# Patient Record
Sex: FEMALE | Race: WHITE | NOT HISPANIC OR LATINO | Employment: OTHER | URBAN - NONMETROPOLITAN AREA
[De-identification: names, ages, dates, MRNs, and addresses within clinical notes are randomized per-mention and may not be internally consistent; named-entity substitution may affect disease eponyms.]

---

## 2018-07-27 ENCOUNTER — OFFICE VISIT (OUTPATIENT)
Dept: OTOLARYNGOLOGY | Facility: CLINIC | Age: 63
End: 2018-07-27

## 2018-07-27 ENCOUNTER — PROCEDURE VISIT (OUTPATIENT)
Dept: OTOLARYNGOLOGY | Facility: CLINIC | Age: 63
End: 2018-07-27

## 2018-07-27 VITALS
WEIGHT: 168 LBS | RESPIRATION RATE: 20 BRPM | BODY MASS INDEX: 30.91 KG/M2 | HEART RATE: 65 BPM | DIASTOLIC BLOOD PRESSURE: 78 MMHG | TEMPERATURE: 98 F | SYSTOLIC BLOOD PRESSURE: 136 MMHG | HEIGHT: 62 IN

## 2018-07-27 DIAGNOSIS — H90.3 SENSORINEURAL HEARING LOSS (SNHL), BILATERAL: Primary | ICD-10-CM

## 2018-07-27 DIAGNOSIS — H69.83 DYSFUNCTION OF BOTH EUSTACHIAN TUBES: ICD-10-CM

## 2018-07-27 DIAGNOSIS — J30.9 ALLERGIC RHINITIS, UNSPECIFIED CHRONICITY, UNSPECIFIED SEASONALITY, UNSPECIFIED TRIGGER: ICD-10-CM

## 2018-07-27 DIAGNOSIS — H93.13 TINNITUS OF BOTH EARS: ICD-10-CM

## 2018-07-27 PROCEDURE — 92550 TYMPANOMETRY & REFLEX THRESH: CPT | Performed by: AUDIOLOGIST

## 2018-07-27 PROCEDURE — 99203 OFFICE O/P NEW LOW 30 MIN: CPT | Performed by: NURSE PRACTITIONER

## 2018-07-27 RX ORDER — LEVOTHYROXINE SODIUM 75 MCG
75 TABLET ORAL DAILY
Refills: 3 | COMMUNITY
Start: 2018-05-25

## 2018-07-27 RX ORDER — FLUTICASONE PROPIONATE 50 MCG
2 SPRAY, SUSPENSION (ML) NASAL DAILY
Qty: 1 BOTTLE | Refills: 6 | Status: SHIPPED | OUTPATIENT
Start: 2018-07-27 | End: 2018-08-26

## 2018-07-27 RX ORDER — FLUTICASONE PROPIONATE 50 MCG
2 SPRAY, SUSPENSION (ML) NASAL DAILY
COMMUNITY
End: 2018-07-27 | Stop reason: SDUPTHER

## 2018-07-27 RX ORDER — METOPROLOL SUCCINATE AND HYDROCHLOROTHIAZIDE 25; 12.5 MG/1; MG/1
TABLET ORAL
COMMUNITY

## 2018-07-27 RX ORDER — ERGOCALCIFEROL 1.25 MG/1
50000 CAPSULE ORAL WEEKLY
Refills: 3 | COMMUNITY
Start: 2018-07-14

## 2018-07-27 RX ORDER — LORAZEPAM 1 MG/1
TABLET ORAL
Refills: 0 | COMMUNITY
Start: 2018-05-15

## 2018-07-27 RX ORDER — AZELASTINE 1 MG/ML
2 SPRAY, METERED NASAL 2 TIMES DAILY
Qty: 30 ML | Refills: 6 | Status: SHIPPED | OUTPATIENT
Start: 2018-07-27 | End: 2018-08-26

## 2018-07-27 NOTE — PROGRESS NOTES
CASE HISTORY DETAILS   Ms. Holloway presented to the clinic this date with complaint of recurrent ear infection. She reported long standing symptoms. She also noted seasonal allergies and history of chronic sinus infection. She reported an episode of possible labyrinthitis approximately 6-7 years ago. She has had continued tinnitus since that time but denied ongoing problems with dizziness or balance.        SUMMARY   RIGHT  · Otoscopy revealed monomeric TM.  · Moderately-severe reverse cookie bite sensorineural hearing loss.  · Immitance measures consistent with reduced TM compliance and slight negative ME pressure.    LEFT  · Otoscopy revealed clear EAC/Unremarkable TM.  · Moderately-severe reverse cookie bite sensorineural hearing loss.  · Immitance measures consistent with reduced TM compliance.    RECOMMENDATIONS   Results of today's evaluation were discussed with Ms. Holloway and the following recommendations were made:  1. ENT evaluation today as scheduled.    AUDIOGRAM AND IMMITANCE       Kimym Lopez, CCC-A  Audiologist

## 2018-07-27 NOTE — PROGRESS NOTES
PRIMARY CARE PROVIDER: Herbert Cuellar MD  REFERRING PROVIDER: Herbert Cuellar MD    Chief Complaint   Patient presents with   • Ear Problem   • Sinus Problem       Subjective   History of Present Illness:  Geetha GARCIA is a  62 y.o. female who complains of ear fullness, ear pressure, popping and cracking of the ear and recurrent ear infections. The symptoms are localized to both ears. The patient has had moderate symptoms. The symptoms have been intermittant for the last several years. The symptoms are aggravated by  sinonasal complaints including  nasal drainage, nasal congestion, postnasal drip and allergy. The symptoms are improved by antibiotics. She reports she has a history of ear infections which became more frequent in her 20s when she started having sinus issues. She reports at least one ear infection per year with her most recent infection 1 month ago. She was treated with Augmentin and Cefprozil with resolution of symptoms. She has a long standing history of allergies and sinus infections. She has at least 2 sinus infections per year. She has a history of allergy shots when she was in her 20s. She is scheduled for allergy testing in [place] in a few weeks. She also has a history of labyrinthitis in 2015- she states she had hearing loss associated with this and has had bilateral tinnitus since then. She denies otalgia, otorrhea, dizziness, or vertigo.     Review of Systems:  Review of Systems   Constitutional: Negative for chills and fever.   HENT: Positive for congestion, hearing loss, postnasal drip, rhinorrhea and tinnitus. Negative for ear discharge, ear pain, sinus pain, sinus pressure, sore throat and voice change.    All other systems reviewed and are negative.      Past History:  Past Medical History:   Diagnosis Date   • BCC (basal cell carcinoma)    • High blood pressure    • Hypothyroidism    • Multiple thyroid nodules      Past Surgical History:   Procedure Laterality Date   •  SECTION      • GALLBLADDER SURGERY     • HYSTERECTOMY     • SKIN CANCER EXCISION      BCC EXC     History reviewed. No pertinent family history.  Social History   Substance Use Topics   • Smoking status: Never Smoker   • Smokeless tobacco: Never Used   • Alcohol use Yes      Comment: SOCIAL     Allergies:  Sulfa antibiotics    Current Outpatient Prescriptions:   •  fluticasone (FLONASE) 50 MCG/ACT nasal spray, 2 sprays into the nostril(s) as directed by provider Daily for 30 days., Disp: 1 bottle, Rfl: 6  •  LORazepam (ATIVAN) 1 MG tablet, 1/2-1 TABLET BY MOUTH EVERYDAY AS NEEDED, Disp: , Rfl: 0  •  Metoprolol-HCTZ ER 25-12.5 MG tablet sustained-release 24 hour, Take  by mouth., Disp: , Rfl:   •  SYNTHROID 75 MCG tablet, Take 75 mcg by mouth Daily., Disp: , Rfl: 3  •  vitamin D (ERGOCALCIFEROL) 81504 units capsule capsule, Take 50,000 Units by mouth 1 (One) Time Per Week., Disp: , Rfl: 3  •  azelastine (ASTELIN) 0.1 % nasal spray, 2 sprays into the nostril(s) as directed by provider 2 (Two) Times a Day for 30 days. Use in each nostril as directed, Disp: 30 mL, Rfl: 6      Objective     Vital Signs:  Temp:  [98 °F (36.7 °C)] 98 °F (36.7 °C)  Heart Rate:  [65] 65  Resp:  [20] 20  BP: (136)/(78) 136/78    Physical Exam:  Physical Exam  CONSTITUTIONAL: well nourished, well-developed, alert, oriented, in no acute distress   COMMUNICATION AND VOICE: able to communicate normally, normal voice quality  HEAD: normocephalic, no lesions, atraumatic, no tenderness, no masses   FACE: appearance normal, no lesions, no tenderness, no deformities, facial motion symmetric  SALIVARY GLANDS: parotid glands with no tenderness, no swelling, no masses, submandibular glands with normal size, nontender  EYES: ocular motility normal, eyelids normal, orbits normal, no proptosis, conjunctiva normal , pupils equal, round   EARS:  Hearing: response to conversational voice normal bilaterally   External Ears: auricles without lesions  Otoscopic: tympanic  membrane appearance normal, no lesions, no perforation, normal mobility, no fluid, left TM monomeric  NOSE:  External Nose: structure normal, no tenderness on palpation, no nasal discharge, no lesions, no evidence of trauma, nostrils patent   Intranasal Exam: nasal mucosa inflammation, vestibule within normal limits, inferior turbinate normal  ORAL:  Lips: upper and lower lips without lesion   Teeth: dentition within normal limits for age   Gums: gingivae healthy   Oral Mucosa: oral mucosa normal, no mucosal lesions   Floor of Mouth: Warthin’s duct patent, mucosa normal  Tongue: lingual mucosa normal without lesions, normal tongue mobility   Palate: soft and hard palates with normal mucosa and structure  Oropharynx: oropharyngeal mucosa normal  NECK: neck appearance normal, no masses or tenderness  THYROID: no overt thyromegaly, no tenderness, nodules or mass present on palpation, position midline   LYMPH NODES: no lymphadenopathy  CHEST/RESPIRATORY: respiratory effort normal, normal breath sounds   CARDIOVASCULAR: rate and rhythm normal, extremities without cyanosis or edema    NEUROLOGIC/PSYCHIATRIC: oriented to time, place and person, mood normal, affect appropriate, CN II-XII intact grossly    Results Review:           Assessment   Assessment:  1. Sensorineural hearing loss (SNHL), bilateral    2. Dysfunction of both eustachian tubes    3. Allergic rhinitis, unspecified chronicity, unspecified seasonality, unspecified trigger    4. Tinnitus of both ears    5. BMI 30.0-30.9,adult        Plan   Plan:    New Medications Ordered This Visit   Medications   • fluticasone (FLONASE) 50 MCG/ACT nasal spray     Si sprays into the nostril(s) as directed by provider Daily for 30 days.     Dispense:  1 bottle     Refill:  6   • azelastine (ASTELIN) 0.1 % nasal spray     Si sprays into the nostril(s) as directed by provider 2 (Two) Times a Day for 30 days. Use in each nostril as directed     Dispense:  30 mL      Refill:  6     Start nasal sprays. The proper use of nasal inhalers was discussed including the need for regular use and build up of drug levels before full effects.     QUALITY MEASURES    Body Mass Index Screening and Follow-Up Plan  Body mass index is 30.73 kg/m².  Patient's Body mass index is 30.73 kg/m². BMI is above normal parameters. Recommendations include: educational material.    Tobacco Use: Screening and Cessation Intervention  Smoking status: Never Smoker                                                              Smokeless tobacco: Never Used                          Return in about 3 months (around 10/27/2018), or if symptoms worsen or fail to improve, for Recheck.    My findings and recommendations were discussed and questions were answered.     Evangelina Mclaughlin, ROBYN  07/27/18  12:17 PM

## 2018-07-27 NOTE — PATIENT INSTRUCTIONS
###### BMI  #####   MyPlate from USDA  The general, healthful diet is based on the 2010 Dietary Guidelines for Americans. The amount of food you need to eat from each food group depends on your age, sex, and level of physical activity and can be individualized by a dietitian. Go to ChooseMyPlate.gov for more information.  What do I need to know about the MyPlate plan?  · Enjoy your food, but eat less.  · Avoid oversized portions.  ? ½ of your plate should include fruits and vegetables.  ? ¼ of your plate should be grains.  ? ¼ of your plate should be protein.  Grains  · Make at least half of your grains whole grains.  · For a 2,000 calorie daily food plan, eat 6 oz every day.  · 1 oz is about 1 slice bread, 1 cup cereal, or ½ cup cooked rice, cereal, or pasta.  Vegetables  · Make half your plate fruits and vegetables.  · For a 2,000 calorie daily food plan, eat 2½ cups every day.  · 1 cup is about 1 cup raw or cooked vegetables or vegetable juice or 2 cups raw leafy greens.  Fruits  · Make half your plate fruits and vegetables.  · For a 2,000 calorie daily food plan, eat 2 cups every day.  · 1 cup is about 1 cup fruit or 100% fruit juice or ½ cup dried fruit.  Protein  · For a 2,000 calorie daily food plan, eat 5½ oz every day.  · 1 oz is about 1 oz meat, poultry, or fish, ¼ cup cooked beans, 1 egg, 1 Tbsp peanut butter, or ½ oz nuts or seeds.  Dairy  · Switch to fat-free or low-fat (1%) milk.  · For a 2,000 calorie daily food plan, eat 3 cups every day.  · 1 cup is about 1 cup milk or yogurt or soy milk (soy beverage), 1½ oz natural cheese, or 2 oz processed cheese.  Fats, Oils, and Empty Calories  · Only small amounts of oils are recommended.  · Empty calories are calories from solid fats or added sugars.  · Compare sodium in foods like soup, bread, and frozen meals. Choose the foods with lower numbers.  · Drink water instead of sugary drinks.  What foods can I eat?  Grains  Whole grains such as whole wheat,  quinoa, millet, and bulgur. Bread, rolls, and pasta made from whole grains. Brown or wild rice. Hot or cold cereals made from whole grains and without added sugar.  Vegetables  All fresh vegetables, especially fresh red, dark green, or orange vegetables. Peas and beans. Low-sodium frozen or canned vegetables prepared without added salt. Low-sodium vegetable juices.  Fruits  All fresh, frozen, and dried fruits. Canned fruit packed in water or fruit juice without added sugar. Fruit juices without added sugar.  Meats and Other Protein Sources  Boiled, baked, or grilled lean meat trimmed of fat. Skinless poultry. Fresh seafood and shellfish. Canned seafood packed in water. Unsalted nuts and unsalted nut butters. Tofu. Dried beans and pea. Eggs.  Dairy  Low-fat or fat-free milk, yogurt, and cheeses.  Sweets and Desserts  Frozen desserts made from low-fat milk.  Fats and Oils  Olive, peanut, and canola oils and margarine. Salad dressing and mayonnaise made from these oils.  Other  Soups and casseroles made from allowed ingredients and without added fat or salt.  The items listed above may not be a complete list of recommended foods or beverages. Contact your dietitian for more options.  What foods are not recommended?  Grains  Sweetened, low-fiber cereals. Packaged baked goods. Snack crackers and chips. Cheese crackers, butter crackers, and biscuits. Frozen waffles, sweet breads, doughnuts, pastries, packaged baking mixes, pancakes, cakes, and cookies.  Vegetables  Regular canned or frozen vegetables or vegetables prepared with salt. Canned tomatoes. Canned tomato sauce. Fried vegetables. Vegetables in cream sauce or cheese sauce.  Fruits  Fruits packed in syrup or made with added sugar.  Meats and Other Protein Sources  Marbled or fatty meats such as ribs. Poultry with skin. Fried meats, poultry, eggs, or fish. Sausages, hot dogs, and deli meats such as pastrami, bologna, or salami.  Dairy  Whole milk, cream, cheeses  made from whole milk, sour cream. Ice cream or yogurt made from whole milk or with added sugar.  Beverages  For adults, no more than one alcoholic drink per day. Regular soft drinks or other sugary beverages. Juice drinks.  Sweets and Desserts  Sugary or fatty desserts, candy, and other sweets.  Fats and Oils  Solid shortening or partially hydrogenated oils. Solid margarine. Margarine that contains trans fats. Butter.  The items listed above may not be a complete list of foods and beverages to avoid. Contact your dietitian for more information.  This information is not intended to replace advice given to you by your health care provider. Make sure you discuss any questions you have with your health care provider.  Document Released: 01/06/2009 Document Revised: 05/25/2017 Document Reviewed: 11/26/2014  [employer] Interactive Patient Education © 2018 [employer]     Calorie Counting for Weight Loss  Calories are units of energy. Your body needs a certain amount of calories from food to keep you going throughout the day. When you eat more calories than your body needs, your body stores the extra calories as fat. When you eat fewer calories than your body needs, your body burns fat to get the energy it needs.  Calorie counting means keeping track of how many calories you eat and drink each day. Calorie counting can be helpful if you need to lose weight. If you make sure to eat fewer calories than your body needs, you should lose weight. Ask your health care provider what a healthy weight is for you.  For calorie counting to work, you will need to eat the right number of calories in a day in order to lose a healthy amount of weight per week. A dietitian can help you determine how many calories you need in a day and will give you suggestions on how to reach your calorie goal.  · A healthy amount of weight to lose per week is usually 1-2 lb (0.5-0.9 kg). This usually means that your daily calorie intake should be reduced  by 500-750 calories.  · Eating 1,200 - 1,500 calories per day can help most women lose weight.  · Eating 1,500 - 1,800 calories per day can help most men lose weight.    What do I need to know about calorie counting?  In order to meet your daily calorie goal, you will need to:  · Find out how many calories are in each food you would like to eat. Try to do this before you eat.  · Decide how much of the food you plan to eat.  · Write down what you ate and how many calories it had. Doing this is called keeping a food log.    To successfully lose weight, it is important to balance calorie counting with a healthy lifestyle that includes regular activity. Aim for 150 minutes of moderate exercise (such as walking) or 75 minutes of vigorous exercise (such as running) each week.  Where do I find calorie information?    The number of calories in a food can be found on a Nutrition Facts label. If a food does not have a Nutrition Facts label, try to look up the calories online or ask your dietitian for help.  Remember that calories are listed per serving. If you choose to have more than one serving of a food, you will have to multiply the calories per serving by the amount of servings you plan to eat. For example, the label on a package of bread might say that a serving size is 1 slice and that there are 90 calories in a serving. If you eat 1 slice, you will have eaten 90 calories. If you eat 2 slices, you will have eaten 180 calories.  How do I keep a food log?  Immediately after each meal, record the following information in your food log:  · What you ate. Don't forget to include toppings, sauces, and other extras on the food.  · How much you ate. This can be measured in cups, ounces, or number of items.  · How many calories each food and drink had.  · The total number of calories in the meal.    Keep your food log near you, such as in a small notebook in your pocket, or use a mobile raquel or website. Some programs will  "calculate calories for you and show you how many calories you have left for the day to meet your goal.  What are some calorie counting tips?  · Use your calories on foods and drinks that will fill you up and not leave you hungry:  ? Some examples of foods that fill you up are nuts and nut butters, vegetables, lean proteins, and high-fiber foods like whole grains. High-fiber foods are foods with more than 5 g fiber per serving.  ? Drinks such as sodas, specialty coffee drinks, alcohol, and juices have a lot of calories, yet do not fill you up.  · Eat nutritious foods and avoid empty calories. Empty calories are calories you get from foods or beverages that do not have many vitamins or protein, such as candy, sweets, and soda. It is better to have a nutritious high-calorie food (such as an avocado) than a food with few nutrients (such as a bag of chips).  · Know how many calories are in the foods you eat most often. This will help you calculate calorie counts faster.  · Pay attention to calories in drinks. Low-calorie drinks include water and unsweetened drinks.  · Pay attention to nutrition labels for \"low fat\" or \"fat free\" foods. These foods sometimes have the same amount of calories or more calories than the full fat versions. They also often have added sugar, starch, or salt, to make up for flavor that was removed with the fat.  · Find a way of tracking calories that works for you. Get creative. Try different apps or programs if writing down calories does not work for you.  What are some portion control tips?  · Know how many calories are in a serving. This will help you know how many servings of a certain food you can have.  · Use a measuring cup to measure serving sizes. You could also try weighing out portions on a kitchen scale. With time, you will be able to estimate serving sizes for some foods.  · Take some time to put servings of different foods on your favorite plates, bowls, and cups so you know what a " serving looks like.  · Try not to eat straight from a bag or box. Doing this can lead to overeating. Put the amount you would like to eat in a cup or on a plate to make sure you are eating the right portion.  · Use smaller plates, glasses, and bowls to prevent overeating.  · Try not to multitask (for example, watch TV or use your computer) while eating. If it is time to eat, sit down at a table and enjoy your food. This will help you to know when you are full. It will also help you to be aware of what you are eating and how much you are eating.  What are tips for following this plan?  Reading food labels  · Check the calorie count compared to the serving size. The serving size may be smaller than what you are used to eating.  · Check the source of the calories. Make sure the food you are eating is high in vitamins and protein and low in saturated and trans fats.  Shopping  · Read nutrition labels while you shop. This will help you make healthy decisions before you decide to purchase your food.  · Make a grocery list and stick to it.  Cooking  · Try to cook your favorite foods in a healthier way. For example, try baking instead of frying.  · Use low-fat dairy products.  Meal planning  · Use more fruits and vegetables. Half of your plate should be fruits and vegetables.  · Include lean proteins like poultry and fish.  How do I count calories when eating out?  · Ask for smaller portion sizes.  · Consider sharing an entree and sides instead of getting your own entree.  · If you get your own entree, eat only half. Ask for a box at the beginning of your meal and put the rest of your entree in it so you are not tempted to eat it.  · If calories are listed on the menu, choose the lower calorie options.  · Choose dishes that include vegetables, fruits, whole grains, low-fat dairy products, and lean protein.  · Choose items that are boiled, broiled, grilled, or steamed. Stay away from items that are buttered, battered, fried,  or served with cream sauce. Items labeled “crispy” are usually fried, unless stated otherwise.  · Choose water, low-fat milk, unsweetened iced tea, or other drinks without added sugar. If you want an alcoholic beverage, choose a lower calorie option such as a glass of wine or light beer.  · Ask for dressings, sauces, and syrups on the side. These are usually high in calories, so you should limit the amount you eat.  · If you want a salad, choose a garden salad and ask for grilled meats. Avoid extra toppings like stallings, cheese, or fried items. Ask for the dressing on the side, or ask for olive oil and vinegar or lemon to use as dressing.  · Estimate how many servings of a food you are given. For example, a serving of cooked rice is ½ cup or about the size of half a baseball. Knowing serving sizes will help you be aware of how much food you are eating at restaurants. The list below tells you how big or small some common portion sizes are based on everyday objects:  ? 1 oz--4 stacked dice.  ? 3 oz--1 deck of cards.  ? 1 tsp--1 die.  ? 1 Tbsp--½ a ping-pong ball.  ? 2 Tbsp--1 ping-pong ball.  ? ½ cup--½ baseball.  ? 1 cup--1 baseball.  Summary  · Calorie counting means keeping track of how many calories you eat and drink each day. If you eat fewer calories than your body needs, you should lose weight.  · A healthy amount of weight to lose per week is usually 1-2 lb (0.5-0.9 kg). This usually means reducing your daily calorie intake by 500-750 calories.  · The number of calories in a food can be found on a Nutrition Facts label. If a food does not have a Nutrition Facts label, try to look up the calories online or ask your dietitian for help.  · Use your calories on foods and drinks that will fill you up, and not on foods and drinks that will leave you hungry.  · Use smaller plates, glasses, and bowls to prevent overeating.  This information is not intended to replace advice given to you by your health care provider.  Make sure you discuss any questions you have with your health care provider.  Document Released: 12/18/2006 Document Revised: 11/17/2017 Document Reviewed: 11/17/2017  [employer] Interactive Patient Education © 2018 [employer]     Exercising to Lose Weight  Exercising can help you to lose weight. In order to lose weight through exercise, you need to do vigorous-intensity exercise. You can tell that you are exercising with vigorous intensity if you are breathing very hard and fast and cannot hold a conversation while exercising.  Moderate-intensity exercise helps to maintain your current weight. You can tell that you are exercising at a moderate level if you have a higher heart rate and faster breathing, but you are still able to hold a conversation.  How often should I exercise?  Choose an activity that you enjoy and set realistic goals. Your health care provider can help you to make an activity plan that works for you. Exercise regularly as directed by your health care provider. This may include:  · Doing resistance training twice each week, such as:  ? Push-ups.  ? Sit-ups.  ? Lifting weights.  ? Using resistance bands.  · Doing a given intensity of exercise for a given amount of time. Choose from these options:  ? 150 minutes of moderate-intensity exercise every week.  ? 75 minutes of vigorous-intensity exercise every week.  ? A mix of moderate-intensity and vigorous-intensity exercise every week.    Children, pregnant women, people who are out of shape, people who are overweight, and older adults may need to consult a health care provider for individual recommendations. If you have any sort of medical condition, be sure to consult your health care provider before starting a new exercise program.  What are some activities that can help me to lose weight?  · Walking at a rate of at least 4.5 miles an hour.  · Jogging or running at a rate of 5 miles per hour.  · Biking at a rate of at least 10 miles per  hour.  · Lap swimming.  · Roller-skating or in-line skating.  · Cross-country skiing.  · Vigorous competitive sports, such as football, basketball, and soccer.  · Jumping rope.  · Aerobic dancing.  How can I be more active in my day-to-day activities?  · Use the stairs instead of the elevator.  · Take a walk during your lunch break.  · If you drive, park your car farther away from work or school.  · If you take public transportation, get off one stop early and walk the rest of the way.  · Make all of your phone calls while standing up and walking around.  · Get up, stretch, and walk around every 30 minutes throughout the day.  What guidelines should I follow while exercising?  · Do not exercise so much that you hurt yourself, feel dizzy, or get very short of breath.  · Consult your health care provider prior to starting a new exercise program.  · Wear comfortable clothes and shoes with good support.  · Drink plenty of water while you exercise to prevent dehydration or heat stroke. Body water is lost during exercise and must be replaced.  · Work out until you breathe faster and your heart beats faster.  This information is not intended to replace advice given to you by your health care provider. Make sure you discuss any questions you have with your health care provider.  Document Released: 01/20/2012 Document Revised: 05/25/2017 Document Reviewed: 05/21/2015  [email or web address] Interactive Patient Education © 2018 [employer]

## 2018-10-12 ENCOUNTER — OFFICE VISIT (OUTPATIENT)
Dept: OTOLARYNGOLOGY | Facility: CLINIC | Age: 63
End: 2018-10-12

## 2018-10-12 VITALS
WEIGHT: 168 LBS | HEIGHT: 62 IN | BODY MASS INDEX: 30.91 KG/M2 | RESPIRATION RATE: 20 BRPM | SYSTOLIC BLOOD PRESSURE: 136 MMHG | TEMPERATURE: 98 F | DIASTOLIC BLOOD PRESSURE: 78 MMHG | HEART RATE: 64 BPM

## 2018-10-12 DIAGNOSIS — H69.83 DYSFUNCTION OF BOTH EUSTACHIAN TUBES: Primary | ICD-10-CM

## 2018-10-12 DIAGNOSIS — J30.9 ALLERGIC RHINITIS, UNSPECIFIED SEASONALITY, UNSPECIFIED TRIGGER: ICD-10-CM

## 2018-10-12 DIAGNOSIS — H93.13 TINNITUS OF BOTH EARS: ICD-10-CM

## 2018-10-12 DIAGNOSIS — M26.609 TMJ (TEMPOROMANDIBULAR JOINT DISORDER): ICD-10-CM

## 2018-10-12 DIAGNOSIS — H90.3 SENSORINEURAL HEARING LOSS (SNHL), BILATERAL: ICD-10-CM

## 2018-10-12 PROCEDURE — 99213 OFFICE O/P EST LOW 20 MIN: CPT | Performed by: NURSE PRACTITIONER

## 2018-10-12 RX ORDER — LEVOCETIRIZINE DIHYDROCHLORIDE 5 MG/1
5 TABLET, FILM COATED ORAL
COMMUNITY
Start: 2018-08-06 | End: 2019-02-03

## 2018-10-12 RX ORDER — FLUTICASONE PROPIONATE 50 MCG
2 SPRAY, SUSPENSION (ML) NASAL
COMMUNITY

## 2018-10-12 RX ORDER — ESOMEPRAZOLE MAGNESIUM 40 MG/1
40 CAPSULE, DELAYED RELEASE ORAL
COMMUNITY

## 2018-10-12 RX ORDER — MELOXICAM 15 MG/1
TABLET ORAL
COMMUNITY
Start: 2018-10-11

## 2018-10-27 PROBLEM — H93.13 TINNITUS OF BOTH EARS: Status: ACTIVE | Noted: 2018-10-27

## 2018-10-27 PROBLEM — M26.609 TMJ (TEMPOROMANDIBULAR JOINT DISORDER): Status: ACTIVE | Noted: 2018-10-27

## 2018-10-27 NOTE — PROGRESS NOTES
PRIMARY CARE PROVIDER: Herbert Cuellar MD  REFERRING PROVIDER: No ref. provider found    Chief Complaint   Patient presents with   • Follow-up       Subjective   History of Present Illness:  Geetha GARCIA is a  63 y.o. female who is here to follow-up from complaints of ear fullness, ear pressure, popping and cracking of the ear and recurrent ear infections. The symptoms are localized to both ears. The patient has had stable symptoms since her last visit. The symptoms have been intermittant for the last several years. The symptoms are aggravated by  sinonasal complaints including  nasal drainage, nasal congestion, postnasal drip and allergy. The symptoms are improved by antibiotics. She reports she has a history of ear infections which became more frequent in her 20s when she started having sinus issues. She reports at least one ear infection per year.  She has been taking Flonase and Xyzal and she feels this has stabilized her symptoms and her symptoms are currently well controlled.  She has not had any ear infections since her last visit.    She has a long standing history of allergies and sinus infections. She has at least 2 sinus infections per year. She has a history of allergy shots when she was in her 20s.  She was recently allergy tested in [place] and states she will be starting immunotherapy in April.     She also has a history of labyrinthitis in 2015- she states she had hearing loss associated with this and has had bilateral tinnitus since then. She denies otalgia, otorrhea, dizziness, or vertigo.     She reports a recent flair up of otalgia of the left ear.  This is been present for the last few months.  She does report a history of TMJ.  She has been wearing a mouth guard and was started on mobic with an improvement in symptoms.  Her dentist made her a custom mouth guard.  She does report an increase in stress over the last year.      Review of Systems:  Review of Systems   Constitutional: Negative  "for chills and fever.   HENT: Positive for congestion, ear pain, hearing loss and tinnitus. Negative for ear discharge, postnasal drip, rhinorrhea, sinus pain, sinus pressure, sore throat and voice change.    All other systems reviewed and are negative.      Past History:  Past Medical History:   Diagnosis Date   • BCC (basal cell carcinoma)    • High blood pressure    • Hypothyroidism    • Multiple thyroid nodules      Past Surgical History:   Procedure Laterality Date   •  SECTION     • GALLBLADDER SURGERY     • HYSTERECTOMY     • SKIN CANCER EXCISION      BCC EXC     History reviewed. No pertinent family history.  Social History   Substance Use Topics   • Smoking status: Never Smoker   • Smokeless tobacco: Never Used   • Alcohol use Yes      Comment: SOCIAL     Allergies:  Tramadol; Betamethasone; and Sulfa antibiotics    Current Outpatient Prescriptions:   •  esomeprazole (nexIUM) 40 MG capsule, Take 40 mg by mouth., Disp: , Rfl:   •  fluticasone (FLONASE) 50 MCG/ACT nasal spray, 2 sprays into the nostril(s) as directed by provider., Disp: , Rfl:   •  levocetirizine (XYZAL) 5 MG tablet, Take 5 mg by mouth., Disp: , Rfl:   •  LORazepam (ATIVAN) 1 MG tablet, 1/2-1 TABLET BY MOUTH EVERYDAY AS NEEDED, Disp: , Rfl: 0  •  meloxicam (MOBIC) 15 MG tablet, , Disp: , Rfl:   •  Metoprolol-HCTZ ER 25-12.5 MG tablet sustained-release 24 hour, Take  by mouth., Disp: , Rfl:   •  SYNTHROID 75 MCG tablet, Take 75 mcg by mouth Daily., Disp: , Rfl: 3  •  vitamin D (ERGOCALCIFEROL) 40612 units capsule capsule, Take 50,000 Units by mouth 1 (One) Time Per Week., Disp: , Rfl: 3      Objective     Vital Signs:    /78   Pulse 64   Temp 98 °F (36.7 °C)   Resp 20   Ht 157.5 cm (62\")   Wt 76.2 kg (168 lb)   BMI 30.73 kg/m²     Physical Exam:  Physical Exam  CONSTITUTIONAL: well nourished, well-developed, alert, oriented, in no acute distress   COMMUNICATION AND VOICE: able to communicate normally, normal voice " quality  HEAD: normocephalic, no lesions, atraumatic, no tenderness, no masses   FACE: appearance normal, no lesions, no tenderness, no deformities, facial motion symmetric, bilateral TMJ crepitus  SALIVARY GLANDS: parotid glands with no tenderness, no swelling, no masses, submandibular glands with normal size, nontender  EYES: ocular motility normal, eyelids normal, orbits normal, no proptosis, conjunctiva normal , pupils equal, round   EARS:  Hearing: response to conversational voice normal bilaterally   External Ears: auricles without lesions  Otoscopic: tympanic membrane appearance normal, no lesions, no perforation, normal mobility, no fluid, left TM monomeric  NOSE:  External Nose: structure normal, no tenderness on palpation, no nasal discharge, no lesions, no evidence of trauma, nostrils patent   Intranasal Exam: nasal mucosa inflammation, vestibule within normal limits, inferior turbinate normal  ORAL:  Lips: upper and lower lips without lesion   Teeth: dentition within normal limits for age   Gums: gingivae healthy   Oral Mucosa: oral mucosa normal, no mucosal lesions   Floor of Mouth: Warthin’s duct patent, mucosa normal  Tongue: lingual mucosa normal without lesions, normal tongue mobility   Palate: soft and hard palates with normal mucosa and structure  Oropharynx: oropharyngeal mucosa normal  NECK: neck appearance normal, no masses or tenderness  THYROID: no overt thyromegaly, no tenderness, nodules or mass present on palpation, position midline   LYMPH NODES: no lymphadenopathy  CHEST/RESPIRATORY: respiratory effort normal, normal breath sounds   CARDIOVASCULAR: rate and rhythm normal, extremities without cyanosis or edema    NEUROLOGIC/PSYCHIATRIC: oriented to time, place and person, mood normal, affect appropriate, CN II-XII intact grossly    Results Review:           Assessment   Assessment:  1. Dysfunction of both eustachian tubes    2. Allergic rhinitis, unspecified seasonality, unspecified  trigger    3. Sensorineural hearing loss (SNHL), bilateral    4. Tinnitus of both ears    5. TMJ (temporomandibular joint disorder)    6. BMI 30.0-30.9,adult        Plan   Plan:    Continue Flonase and Xyzal.     TMJ precautions discussed and handout given.    Patient inquired about obtaining MRI due to her hearing loss and history of labyrinthitis.  I do not feel this particularly necessary at this time.  She remains a little anxious about this.  We will continue to monitor her hearing loss with a repeat audiogram at follow-up.  She would like to consider an MRI if hearing worsens.  The patient is agreeable to this plan.    Call for ear drainage, ear pain, fever over 101, or hearing loss. Call for problems or worsening symptoms.       QUALITY MEASURES    Body Mass Index Screening and Follow-Up Plan  Body mass index is 30.73 kg/m².  Patient's Body mass index is 30.73 kg/m². BMI is above normal parameters. Recommendations include: educational material.    Tobacco Use: Screening and Cessation Intervention  Smoking status: Never Smoker                                                              Smokeless tobacco: Never Used                          Return in about 7 months (around 5/12/2019) for With Audio.    My findings and recommendations were discussed and questions were answered.     ROBYN Nolasco

## 2021-07-07 ENCOUNTER — OFFICE VISIT (OUTPATIENT)
Dept: OTOLARYNGOLOGY | Facility: CLINIC | Age: 66
End: 2021-07-07

## 2021-07-07 VITALS — HEIGHT: 61 IN | WEIGHT: 172.6 LBS | TEMPERATURE: 98.6 F | BODY MASS INDEX: 32.59 KG/M2

## 2021-07-07 DIAGNOSIS — D48.5 NEOPLASM OF UNCERTAIN BEHAVIOR OF SKIN: Primary | ICD-10-CM

## 2021-07-07 DIAGNOSIS — L81.4 SOLAR LENTIGO: ICD-10-CM

## 2021-07-07 PROCEDURE — 99214 OFFICE O/P EST MOD 30 MIN: CPT | Performed by: OTOLARYNGOLOGY

## 2021-07-07 RX ORDER — LEVOCETIRIZINE DIHYDROCHLORIDE 5 MG/1
5 TABLET, FILM COATED ORAL DAILY
COMMUNITY
Start: 2021-06-29

## 2021-07-07 RX ORDER — AMOXICILLIN AND CLAVULANATE POTASSIUM 875; 125 MG/1; MG/1
1 TABLET, FILM COATED ORAL 2 TIMES DAILY
COMMUNITY
Start: 2021-07-02 | End: 2022-04-13

## 2021-07-07 RX ORDER — CYCLOBENZAPRINE HCL 10 MG
10 TABLET ORAL DAILY
COMMUNITY
Start: 2021-03-02

## 2021-07-07 RX ORDER — ROSUVASTATIN CALCIUM 5 MG/1
5 TABLET, COATED ORAL
COMMUNITY
Start: 2021-04-24

## 2021-07-07 NOTE — PROGRESS NOTES
PRIMARY CARE PROVIDER: Herbert Cuellar MD  REFERRING PROVIDER: No ref. provider found    Chief Complaint   Patient presents with   • Skin Lesion       Subjective   History of Present Illness:  Geetha Holloway is a  65 y.o. female who presents for consultation regarding a skin lesion of the left cheek.  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    Review of Systems:  Review of Systems   Constitutional: Negative for chills, fatigue, fever and unexpected weight change.   HENT: Negative for facial swelling.    Respiratory: Negative for cough, chest tightness and shortness of breath.    Cardiovascular: Negative for chest pain.   Musculoskeletal: Negative for neck pain.   Skin: Positive for color change.   Neurological: Negative for facial asymmetry.   Hematological: Negative for adenopathy. Does not bruise/bleed easily.       Past History:  Past Medical History:   Diagnosis Date   • BCC (basal cell carcinoma)    • High blood pressure    • Hypothyroidism    • Multiple thyroid nodules      Past Surgical History:   Procedure Laterality Date   •  SECTION     • GALLBLADDER SURGERY     • HYSTERECTOMY     • REPLACEMENT TOTAL KNEE     • SKIN CANCER EXCISION      BCC EXC     History reviewed. No pertinent family history.  Social History     Tobacco Use   • Smoking status: Never Smoker   • Smokeless tobacco: Never Used   Substance Use Topics   • Alcohol use: Yes     Comment: SOCIAL   • Drug use: Not on file     Allergies:  Tramadol, Betamethasone, and Sulfa antibiotics    Current Outpatient Medications:   •  amoxicillin-clavulanate (AUGMENTIN) 875-125 MG per tablet, Take 1 tablet by mouth 2 (Two) Times a Day., Disp: , Rfl:   •  esomeprazole  (nexIUM) 40 MG capsule, Take 40 mg by mouth., Disp: , Rfl:   •  fluticasone (FLONASE) 50 MCG/ACT nasal spray, 2 sprays into the nostril(s) as directed by provider., Disp: , Rfl:   •  levocetirizine (XYZAL) 5 MG tablet, Take 5 mg by mouth Daily., Disp: , Rfl:   •  meloxicam (MOBIC) 15 MG tablet, , Disp: , Rfl:   •  Metoprolol-HCTZ ER 25-12.5 MG tablet sustained-release 24 hour, Take  by mouth., Disp: , Rfl:   •  rosuvastatin (CRESTOR) 5 MG tablet, Take 5 mg by mouth every night at bedtime., Disp: , Rfl:   •  SYNTHROID 75 MCG tablet, Take 75 mcg by mouth Daily., Disp: , Rfl: 3  •  vitamin D (ERGOCALCIFEROL) 43467 units capsule capsule, Take 50,000 Units by mouth 1 (One) Time Per Week., Disp: , Rfl: 3  •  cyclobenzaprine (FLEXERIL) 10 MG tablet, Take 10 mg by mouth Daily., Disp: , Rfl:   •  LORazepam (ATIVAN) 1 MG tablet, 1/2-1 TABLET BY MOUTH EVERYDAY AS NEEDED, Disp: , Rfl: 0    Objective     Vital Signs:  Temp:  [98.6 °F (37 °C)] 98.6 °F (37 °C)    Physical Exam:  Physical Exam    Operative plan:    Excision with layered closure in the office under local    Assessment   1. Neoplasm of uncertain behavior of skin    2. Solar lentigo        Plan     The risks, benefits, and alternatives of the procedure including but not limited to pain, scarring, bleeding, infection, persistent symptoms, and risks of the anesthesia were discussed full with the patient. Need for further therapy, depending on the pathologic outcome, was discussed. Questions were answered. No guarantees were made or implied.      I have offered excision of the skin lesion of the left cheek with  permanent section analysis and likely  linear closure closure in the office under local anesthesia.    Discussion of skin lesion. Discussed risks, benefits, alternatives, and possible complications of excision of the skin lesion with reconstruction utilizing local tissue rearrangement, full-thickness skin grafting, or local interpolated flaps. Risks include, but  are not limited too: bleeding, infection, hematoma, recurrence, need for additional procedures, flap failure, cosmetic deformity. Patient understands risks and would like to proceed with surgery.     She is also interested in the photo facial.  I discussed that this would be best performed in the fall or winter.  She spends the urbina at the [facility] in [place].  She may opt to have it performed then.    My findings and recommendations were discussed and questions were answered.     Mj Wetzel MD  07/07/21  13:55 CDT

## 2021-07-07 NOTE — PATIENT INSTRUCTIONS
CONTACT INFORMATION:  The main office phone number is [phone]. For emergencies after hours and on weekends, this number will convert over to our answering service and the on call provider will answer. Please try to keep non emergent phone calls/ questions to office hours 9am-5pm Monday through Friday.     SlideRocket  As an alternative, you can sign up and use the Epic MyChart system for more direct and quicker access for non emergent questions/ problems.  [facility] [employer]ideRocket allows you to send messages to your doctor, view your test results, renew your prescriptions, schedule appointments, and more. To sign up, go to [employer] and click on the Sign Up Now link in the New User? box. Enter your SlideRocket Activation Code exactly as it appears below along with the last four digits of your Social Security Number and your Date of Birth () to complete the sign-up process. If you do not sign up before the expiration date, you must request a new code.    SlideRocket Activation Code: [email or web address]  Expires: 2021  1:57 PM    If you have questions, you can email [email or web address] or call [phone] to talk to our SlideRocket staff. Remember, [employer]Rocket is NOT to be used for urgent needs. For medical emergencies, dial 911.

## 2021-08-19 ENCOUNTER — TRANSCRIBE ORDERS (OUTPATIENT)
Dept: LAB | Facility: HOSPITAL | Age: 66
End: 2021-08-19

## 2021-08-19 ENCOUNTER — LAB (OUTPATIENT)
Dept: LAB | Facility: HOSPITAL | Age: 66
End: 2021-08-19

## 2021-08-19 DIAGNOSIS — Z01.818 PREOPERATIVE TESTING: Primary | ICD-10-CM

## 2021-08-19 LAB — SARS-COV-2 ORF1AB RESP QL NAA+PROBE: NOT DETECTED

## 2021-08-19 PROCEDURE — C9803 HOPD COVID-19 SPEC COLLECT: HCPCS | Performed by: OTOLARYNGOLOGY

## 2021-08-19 PROCEDURE — U0005 INFEC AGEN DETEC AMPLI PROBE: HCPCS | Performed by: OTOLARYNGOLOGY

## 2021-08-19 PROCEDURE — U0004 COV-19 TEST NON-CDC HGH THRU: HCPCS | Performed by: OTOLARYNGOLOGY

## 2021-08-23 ENCOUNTER — PROCEDURE VISIT (OUTPATIENT)
Dept: OTOLARYNGOLOGY | Facility: CLINIC | Age: 66
End: 2021-08-23

## 2021-08-23 VITALS — HEART RATE: 73 BPM | SYSTOLIC BLOOD PRESSURE: 106 MMHG | DIASTOLIC BLOOD PRESSURE: 68 MMHG | TEMPERATURE: 97.2 F

## 2021-08-23 DIAGNOSIS — D48.5 NEOPLASM OF UNCERTAIN BEHAVIOR OF SKIN: Primary | ICD-10-CM

## 2021-08-23 PROCEDURE — 88305 TISSUE EXAM BY PATHOLOGIST: CPT | Performed by: OTOLARYNGOLOGY

## 2021-08-23 PROCEDURE — 13131 CMPLX RPR F/C/C/M/N/AX/G/H/F: CPT | Performed by: OTOLARYNGOLOGY

## 2021-08-23 PROCEDURE — 11441 EXC FACE-MM B9+MARG 0.6-1 CM: CPT | Performed by: OTOLARYNGOLOGY

## 2021-08-23 NOTE — PATIENT INSTRUCTIONS
CONTACT INFORMATION:  The main office phone number is [phone]. For emergencies after hours and on weekends, this number will convert over to our answering service and the on call provider will answer. Please try to keep non emergent phone calls/ questions to office hours 9am-5pm Monday through Friday.     [employer]  As an alternative, you can sign up and use the Epic MyChart system for more direct and quicker access for non emergent questions/ problems.  [facility] [employer]Ads allows you to send messages to your doctor, view your test results, renew your prescriptions, schedule appointments, and more. To sign up, go to [employer] and click on the Sign Up Now link in the New User? box. Enter your [employer] Activation Code exactly as it appears below along with the last four digits of your Social Security Number and your Date of Birth () to complete the sign-up process. If you do not sign up before the expiration date, you must request a new code.    [employer] Activation Code: [email or web address]  Expires: 2021 11:28 AM    If you have questions, you can email [email or web address] or call [phone] to talk to our [employer] staff. Remember, [employer] is NOT to be used for urgent needs. For medical emergencies, dial 911.

## 2021-08-23 NOTE — PROGRESS NOTES
PATIENT NAME:  Geetha Holloway    DATE:  08/23/21    PREOPERATIVE DIAGNOSIS: Changing, pigmented lesion of left jawline    POSTOPERATIVE DIAGNOSIS: Changing, pigmented lesion of left jawline    PROCEDURE:  1) excision of neoplasm of uncertain behavior skin of left jawline, 8 mm x 20 mm   2) complex closure skin of left jawline, 2.4 cm    SURGEON:  Mj Wetzel MD, FACS    FACILITY: [facility] Office Procedure Room    ANESTHESIA:  Local with 2 cc 1% lidocaine and 1:100,000 epinephrine    DICTATED BY:  Mj Wetzel MD, FACS    IVF: None    IMPLANTS: None    DRAINS: None    SPECIMENS: Neoplasm of uncertain behavior of left cheek, stitch at 12:00-permanent    EBL: 10 cc    COMPLICATIONS: None    INDICATIONS FOR SURGERY: Ms. Holloway presented with a changing, pigmented lesion of the left jawline.  She opted for surgical excision and reconstruction.    OPERATIVE FINDINGS:     Lesion: 3 mm x 3 mm  Margins: 2.5 mm  Defect: 8 mm x 20 mm  Depth: Through dermis  Closure Length: 2.4 cm    OPERATIVE DETAILS:       After patient verification consent material was reviewed, the patient was taken to the procedure room and laid supine on the procedure table.  The skin at the area was cleansed with alcohol, the area marked, the patient was then handed a mirror where we reviewed the intended excision, and verified the consent.  This served as the formal timeout procedure.  The skin was  infiltrated with 1% lidocaine with 1-100,000 epinephrine.    After approximately 15 minutes, the skin was tested for anesthesia, and deemed appropriate.  The lesion was marked with the above listed dimensions, the appropriate margins, as listed above, were drawn around this.  This was then converted to a fusiform-type incision to allow closure and to decrease the standing cutaneous deformities associated with circular to oval-type defects.    The patient was then sterilely prepped and draped.    A 15 blade was used to incise the skin  along the prior-marked plan.  The skin was then undermined in the subcutaneous plane utilizing a #15 blade.  The specimen was oriented with a suture at 12:00 and sent for permanent section analysis.    Utilizing a fresh 15 blade, the skin was undermined in the subcutaneous plane for approximately 8 mm in each direction to facilitate wound closure with reduced tension, and wound eversion.    The skin was closed utilizing deep, buried 5-0 undyed Vicryl suture.  The overlying skin was closed utilizing simple, interrupted 6-0 Prolene.    Antibiotic ointment was placed to the incisions.      DISPOSITION:  The procedures were completed without complication and tolerated well.  The patient was released in the company of her  to return home in satisfactory condition.  A follow-up appointment has been scheduled, routine post-op medications prescribed (if required), and post-op instructions were given to the responsible party.           Mj Wetzel MD, FACS  Board Certified Facial Plastic and Reconstructive Surgery  Board Certified Otolaryngology -- Head and Neck Surgery    Electronically signed by Mj Wetzel MD, 08/23/21, 11:29 AM CDT.

## 2021-08-25 LAB
CYTO UR: NORMAL
LAB AP CASE REPORT: NORMAL
LAB AP CLINICAL INFORMATION: NORMAL
PATH REPORT.FINAL DX SPEC: NORMAL
PATH REPORT.GROSS SPEC: NORMAL

## 2021-08-31 ENCOUNTER — OFFICE VISIT (OUTPATIENT)
Dept: OTOLARYNGOLOGY | Facility: CLINIC | Age: 66
End: 2021-08-31

## 2021-08-31 DIAGNOSIS — Z48.02 VISIT FOR SUTURE REMOVAL: Primary | ICD-10-CM

## 2021-08-31 PROCEDURE — 99024 POSTOP FOLLOW-UP VISIT: CPT | Performed by: OTOLARYNGOLOGY

## 2021-08-31 NOTE — PROGRESS NOTES
Patient here for post-op visit after exc of cheek lesion. Patient is doing well and wound looks great. She was given instructions on wound care after suture removal and she will follow up in 5-6 month and call if she has any problems in between time.

## 2022-04-13 ENCOUNTER — OFFICE VISIT (OUTPATIENT)
Dept: OTOLARYNGOLOGY | Facility: CLINIC | Age: 67
End: 2022-04-13

## 2022-04-13 VITALS — TEMPERATURE: 98 F | RESPIRATION RATE: 20 BRPM | BODY MASS INDEX: 32.47 KG/M2 | HEIGHT: 61 IN | WEIGHT: 172 LBS

## 2022-04-13 DIAGNOSIS — Z85.828 ENCOUNTER FOR FOLLOW-UP SURVEILLANCE OF SKIN CANCER: Primary | ICD-10-CM

## 2022-04-13 DIAGNOSIS — Z08 ENCOUNTER FOR FOLLOW-UP SURVEILLANCE OF SKIN CANCER: Primary | ICD-10-CM

## 2022-04-13 DIAGNOSIS — L82.1 SEBORRHEIC KERATOSES: ICD-10-CM

## 2022-04-13 PROCEDURE — 99212 OFFICE O/P EST SF 10 MIN: CPT | Performed by: OTOLARYNGOLOGY

## 2022-04-13 RX ORDER — LEVOCETIRIZINE DIHYDROCHLORIDE 5 MG/1
1 TABLET, FILM COATED ORAL DAILY
COMMUNITY
End: 2022-04-13

## 2022-04-13 RX ORDER — LEVOTHYROXINE SODIUM 0.07 MG/1
75 TABLET ORAL DAILY
COMMUNITY

## 2022-04-13 RX ORDER — LORAZEPAM 1 MG/1
1 TABLET ORAL EVERY 8 HOURS PRN
COMMUNITY

## 2022-04-13 RX ORDER — SERTRALINE HYDROCHLORIDE 25 MG/1
25 TABLET, FILM COATED ORAL DAILY
COMMUNITY
Start: 2022-03-23

## 2022-04-13 NOTE — PROGRESS NOTES
PRIMARY CARE PROVIDER: Herbert Cuellar MD  REFERRING PROVIDER: No ref. provider found    Chief Complaint   Patient presents with   • Follow-up     melanoma       Subjective   History of Present Illness:  Geetha Holloway is a  66 y.o. female returns for head neck skin cancer surveillance.  She denies any new, or suspicious lesions of the face, scalp, neck.  She was seen by dermatologist in [place] who removed a premalignant lesion from her extremity.  She is also currently seeing Dr. Denisse Marie with 3 suspicious lesions on her body.    She is pleased with the excision of the left jawline.      Review of Systems:  Review of Systems   Constitutional: Negative for chills, fatigue, fever and unexpected weight change.   HENT: Negative for facial swelling.    Respiratory: Negative for cough, chest tightness and shortness of breath.    Cardiovascular: Negative for chest pain.   Musculoskeletal: Negative for neck pain.   Skin: Positive for wound. Negative for color change.   Neurological: Negative for facial asymmetry.   Hematological: Negative for adenopathy. Does not bruise/bleed easily.       Past History:  Past Medical History:   Diagnosis Date   • BCC (basal cell carcinoma)    • High blood pressure    • Hypothyroidism    • Multiple thyroid nodules      Past Surgical History:   Procedure Laterality Date   •  SECTION     • GALLBLADDER SURGERY     • HYSTERECTOMY     • REPLACEMENT TOTAL KNEE     • SKIN CANCER EXCISION      BCC EXC     History reviewed. No pertinent family history.  Social History     Tobacco Use   • Smoking status: Never Smoker   • Smokeless tobacco: Never Used   Substance Use Topics   • Alcohol use: Yes     Comment: SOCIAL     Allergies:  Tramadol, Betamethasone, and Sulfa antibiotics    Current Outpatient Medications:   •  cyclobenzaprine (FLEXERIL) 10 MG tablet, Take 10 mg by mouth Daily., Disp: , Rfl:   •  esomeprazole (nexIUM) 40 MG capsule, Take 40 mg by mouth., Disp: , Rfl:   •  fluticasone  (FLONASE) 50 MCG/ACT nasal spray, 2 sprays into the nostril(s) as directed by provider., Disp: , Rfl:   •  levocetirizine (XYZAL) 5 MG tablet, Take 5 mg by mouth Daily., Disp: , Rfl:   •  levothyroxine (SYNTHROID, LEVOTHROID) 75 MCG tablet, Take 75 mcg by mouth Daily., Disp: , Rfl:   •  LORazepam (ATIVAN) 1 MG tablet, 1/2-1 TABLET BY MOUTH EVERYDAY AS NEEDED, Disp: , Rfl: 0  •  LORazepam (ATIVAN) 1 MG tablet, Take 1 mg by mouth Every 8 (Eight) Hours As Needed., Disp: , Rfl:   •  meloxicam (MOBIC) 15 MG tablet, , Disp: , Rfl:   •  Metoprolol-HCTZ ER 25-12.5 MG tablet sustained-release 24 hour, Take  by mouth., Disp: , Rfl:   •  rosuvastatin (CRESTOR) 5 MG tablet, Take 5 mg by mouth every night at bedtime., Disp: , Rfl:   •  sertraline (ZOLOFT) 25 MG tablet, Take 25 mg by mouth Daily., Disp: , Rfl:   •  SYNTHROID 75 MCG tablet, Take 75 mcg by mouth Daily., Disp: , Rfl: 3  •  vitamin D (ERGOCALCIFEROL) 48008 units capsule capsule, Take 50,000 Units by mouth 1 (One) Time Per Week., Disp: , Rfl: 3      Objective     Vital Signs:  Temp:  [98 °F (36.7 °C)] 98 °F (36.7 °C)  Resp:  [20] 20    Physical Exam:  Physical Exam  Vitals and nursing note reviewed.   Constitutional:       General: She is not in acute distress.     Appearance: She is well-developed. She is not diaphoretic.   HENT:      Head: Normocephalic and atraumatic.        Right Ear: External ear normal.      Left Ear: External ear normal.      Nose: Nose normal.   Eyes:      General: No scleral icterus.        Right eye: No discharge.         Left eye: No discharge.      Conjunctiva/sclera: Conjunctivae normal.      Pupils: Pupils are equal, round, and reactive to light.   Neck:      Thyroid: No thyromegaly.      Vascular: No JVD.      Trachea: No tracheal deviation.   Pulmonary:      Effort: Pulmonary effort is normal.      Breath sounds: No stridor.   Musculoskeletal:         General: No deformity. Normal range of motion.      Cervical back: Normal range of  motion and neck supple.   Lymphadenopathy:      Cervical: No cervical adenopathy.   Skin:     General: Skin is warm and dry.      Coloration: Skin is not pale.      Findings: No erythema or rash.   Neurological:      Mental Status: She is alert and oriented to person, place, and time.      Cranial Nerves: No cranial nerve deficit.      Coordination: Coordination normal.   Psychiatric:         Speech: Speech normal.         Behavior: Behavior normal. Behavior is cooperative.         Thought Content: Thought content normal.         Judgment: Judgment normal.         Results Review:       Assessment   Assessment:  1. Encounter for follow-up surveillance of skin cancer    2. Seborrheic keratoses        Plan   Plan:  She is very pleased with the result.  There is no evidence of recurrence.  She has no suspicious lesions in the scalp, face, neck.  Follow-up as needed.    My findings and recommendations were discussed and questions were answered.     Mj Wetzel MD  04/13/22  14:59 CDT